# Patient Record
Sex: MALE | Race: WHITE | NOT HISPANIC OR LATINO | Employment: FULL TIME | URBAN - METROPOLITAN AREA
[De-identification: names, ages, dates, MRNs, and addresses within clinical notes are randomized per-mention and may not be internally consistent; named-entity substitution may affect disease eponyms.]

---

## 2023-10-23 ENCOUNTER — HOSPITAL ENCOUNTER (EMERGENCY)
Facility: CLINIC | Age: 38
Discharge: HOME | End: 2023-10-24
Attending: EMERGENCY MEDICINE | Admitting: EMERGENCY MEDICINE
Payer: COMMERCIAL

## 2023-10-23 ENCOUNTER — APPOINTMENT (OUTPATIENT)
Dept: RADIOLOGY | Facility: CLINIC | Age: 38
End: 2023-10-23
Attending: EMERGENCY MEDICINE
Payer: COMMERCIAL

## 2023-10-23 ENCOUNTER — APPOINTMENT (OUTPATIENT)
Dept: CT IMAGING | Facility: CLINIC | Age: 38
End: 2023-10-23
Attending: EMERGENCY MEDICINE
Payer: COMMERCIAL

## 2023-10-23 DIAGNOSIS — R55 SYNCOPE, UNSPECIFIED SYNCOPE TYPE: ICD-10-CM

## 2023-10-23 LAB
ALBUMIN SERPL BCG-MCNC: 4.3 G/DL (ref 3.5–5.2)
ALP SERPL-CCNC: 73 U/L (ref 40–129)
ALT SERPL W P-5'-P-CCNC: 31 U/L (ref 0–70)
ANION GAP SERPL CALCULATED.3IONS-SCNC: 9 MMOL/L (ref 7–15)
APTT PPP: 24 SECONDS (ref 22–38)
AST SERPL W P-5'-P-CCNC: 25 U/L (ref 0–45)
ATRIAL RATE - MUSE: 78 BPM
BASOPHILS # BLD AUTO: 0.1 10E3/UL (ref 0–0.2)
BASOPHILS NFR BLD AUTO: 1 %
BILIRUB SERPL-MCNC: 0.4 MG/DL
BUN SERPL-MCNC: 20.6 MG/DL (ref 6–20)
CALCIUM SERPL-MCNC: 8.8 MG/DL (ref 8.6–10)
CHLORIDE SERPL-SCNC: 103 MMOL/L (ref 98–107)
CREAT SERPL-MCNC: 1.25 MG/DL (ref 0.67–1.17)
DEPRECATED HCO3 PLAS-SCNC: 26 MMOL/L (ref 22–29)
DIASTOLIC BLOOD PRESSURE - MUSE: 88 MMHG
EGFRCR SERPLBLD CKD-EPI 2021: 76 ML/MIN/1.73M2
EOSINOPHIL # BLD AUTO: 0.1 10E3/UL (ref 0–0.7)
EOSINOPHIL NFR BLD AUTO: 1 %
ERYTHROCYTE [DISTWIDTH] IN BLOOD BY AUTOMATED COUNT: 11.9 % (ref 10–15)
GLUCOSE SERPL-MCNC: 91 MG/DL (ref 70–99)
HCT VFR BLD AUTO: 41.2 % (ref 40–53)
HGB BLD-MCNC: 13.8 G/DL (ref 13.3–17.7)
IMM GRANULOCYTES # BLD: 0 10E3/UL
IMM GRANULOCYTES NFR BLD: 0 %
INR PPP: 0.94 (ref 0.85–1.15)
INTERPRETATION ECG - MUSE: NORMAL
LYMPHOCYTES # BLD AUTO: 1.9 10E3/UL (ref 0.8–5.3)
LYMPHOCYTES NFR BLD AUTO: 18 %
MAGNESIUM SERPL-MCNC: 3.1 MG/DL (ref 1.7–2.3)
MCH RBC QN AUTO: 28.9 PG (ref 26.5–33)
MCHC RBC AUTO-ENTMCNC: 33.5 G/DL (ref 31.5–36.5)
MCV RBC AUTO: 86 FL (ref 78–100)
MONOCYTES # BLD AUTO: 0.7 10E3/UL (ref 0–1.3)
MONOCYTES NFR BLD AUTO: 7 %
NEUTROPHILS # BLD AUTO: 7.8 10E3/UL (ref 1.6–8.3)
NEUTROPHILS NFR BLD AUTO: 73 %
NRBC # BLD AUTO: 0 10E3/UL
NRBC BLD AUTO-RTO: 0 /100
P AXIS - MUSE: 35 DEGREES
PLATELET # BLD AUTO: 296 10E3/UL (ref 150–450)
POTASSIUM SERPL-SCNC: 4 MMOL/L (ref 3.4–5.3)
PR INTERVAL - MUSE: 168 MS
PROT SERPL-MCNC: 7.3 G/DL (ref 6.4–8.3)
QRS DURATION - MUSE: 100 MS
QT - MUSE: 364 MS
QTC - MUSE: 414 MS
R AXIS - MUSE: 27 DEGREES
RBC # BLD AUTO: 4.77 10E6/UL (ref 4.4–5.9)
SODIUM SERPL-SCNC: 138 MMOL/L (ref 135–145)
SYSTOLIC BLOOD PRESSURE - MUSE: 144 MMHG
T AXIS - MUSE: -5 DEGREES
TROPONIN T SERPL HS-MCNC: <6 NG/L
VENTRICULAR RATE- MUSE: 78 BPM
WBC # BLD AUTO: 10.6 10E3/UL (ref 4–11)

## 2023-10-23 PROCEDURE — 93005 ELECTROCARDIOGRAM TRACING: CPT | Performed by: EMERGENCY MEDICINE

## 2023-10-23 PROCEDURE — 84484 ASSAY OF TROPONIN QUANT: CPT | Performed by: EMERGENCY MEDICINE

## 2023-10-23 PROCEDURE — 85025 COMPLETE CBC W/AUTO DIFF WBC: CPT | Performed by: EMERGENCY MEDICINE

## 2023-10-23 PROCEDURE — 99285 EMERGENCY DEPT VISIT HI MDM: CPT | Mod: 25

## 2023-10-23 PROCEDURE — 71045 X-RAY EXAM CHEST 1 VIEW: CPT

## 2023-10-23 PROCEDURE — 85610 PROTHROMBIN TIME: CPT | Performed by: EMERGENCY MEDICINE

## 2023-10-23 PROCEDURE — 83735 ASSAY OF MAGNESIUM: CPT | Performed by: EMERGENCY MEDICINE

## 2023-10-23 PROCEDURE — 36415 COLL VENOUS BLD VENIPUNCTURE: CPT | Performed by: EMERGENCY MEDICINE

## 2023-10-23 PROCEDURE — 85730 THROMBOPLASTIN TIME PARTIAL: CPT | Performed by: EMERGENCY MEDICINE

## 2023-10-23 PROCEDURE — 80053 COMPREHEN METABOLIC PANEL: CPT | Performed by: EMERGENCY MEDICINE

## 2023-10-23 PROCEDURE — 70450 CT HEAD/BRAIN W/O DYE: CPT

## 2023-10-23 ASSESSMENT — ENCOUNTER SYMPTOMS
NAUSEA: 0
FEVER: 0
LIGHT-HEADEDNESS: 1
SHORTNESS OF BREATH: 0
DIARRHEA: 0
VOMITING: 0
HEADACHES: 0

## 2023-10-23 ASSESSMENT — ACTIVITIES OF DAILY LIVING (ADL): ADLS_ACUITY_SCORE: 35

## 2023-10-24 VITALS
SYSTOLIC BLOOD PRESSURE: 121 MMHG | HEART RATE: 81 BPM | BODY MASS INDEX: 33.18 KG/M2 | DIASTOLIC BLOOD PRESSURE: 81 MMHG | WEIGHT: 224 LBS | RESPIRATION RATE: 15 BRPM | OXYGEN SATURATION: 96 % | TEMPERATURE: 98 F | HEIGHT: 69 IN

## 2023-10-24 LAB — TROPONIN T SERPL HS-MCNC: 7 NG/L

## 2023-10-24 PROCEDURE — 36415 COLL VENOUS BLD VENIPUNCTURE: CPT | Performed by: EMERGENCY MEDICINE

## 2023-10-24 PROCEDURE — 84484 ASSAY OF TROPONIN QUANT: CPT | Performed by: EMERGENCY MEDICINE

## 2023-10-24 NOTE — ED TRIAGE NOTES
Pt reports he was sitting on the couch when he started to feel lightheaded and dizzy.  Pt had a brief syncopal episode while sitting on the couch and his wife assisted him to the ground. Pt denies pain currently along with denying nausea, shortness of breath or dizziness. Pt did have one alcoholic beverage and one hit of THC and reports it is not abnormal for him.  EMS reports pt was orthostatic when they checked vitals.  Pt currently denies any complaints.   Pt reports he did not take his lisinopril for about one week but has the last two days.

## 2023-10-24 NOTE — DISCHARGE INSTRUCTIONS
All of the laboratory tests and imaging studies appear reassuring here tonight in the emergency department.  Your syncopal episode is likely due to a transient drop in your blood pressure.     Continue to rest and to drink plenty of fluids over the next few days.  Follow-up with your primary care provider for routine reevaluation within the next few days.  Return back to ED sooner for any repeat syncopal episodes or any other new or concerning symptoms.

## 2023-10-24 NOTE — ED PROVIDER NOTES
EMERGENCY DEPARTMENT ENCOUNTER      NAME: Binh Schwarz  AGE: 38 year old male  YOB: 1985  MRN: [id number]  EVALUATION DATE & TIME: 10/23/2023  9:22 PM    PCP: [facility]    ED PROVIDER: Annamarie Dong DO      Chief Complaint   Patient presents with    Syncope         FINAL IMPRESSION:  1. Syncope, unspecified syncope type          ED COURSE & MEDICAL DECISION MAKIN-year-old male was brought into the ED for evaluation after he experienced a syncopal episode while at rest prior to ED arrival.  Patient notes that he felt lightheaded prior to the single episode.  However, he denied any other symptoms.  There is no reported trauma or injury with the syncopal episode.  The patient was reportedly noted to be hypotensive when EMS arrived with systolic pressures in the 80s.  Here in the ED the patient had no complaint of any symptoms and he was hemodynamically stable upon arrival to the ED.  The patient did not appear to be in any obvious distress or discomfort at the time of his initial evaluation.  The patient's physical exam was unremarkable.    The EKG reveals normal sinus rhythm with nonspecific T wave changes noted.    CBC, BMP, and troponin were all reassuring.  The patient's PERC score was 0.    CT scan of the head and chest x-ray were both nondiagnostic.    No changes noted in the patient's orthostatic vital signs between lying and standing.  Patient denied any symptoms with standing while his blood pressure was obtained.    Repeat troponin was unchanged.    Patient was reevaluated and informed of the reassuring lab and imaging results.  Patient was informed that his simple episodes likely need to a transient drop in his blood pressure.  After educating and reassure the patient both he and his wife felt comfortable returning home.  The patient was instructed to follow-up with his primary care provider for reevaluation or to return back to ED sooner for any repeat  syncopal episodes, chest pain or pressure, shortness breath, or any other new or concerning symptoms.    Pertinent Labs & Imaging studies reviewed. (See chart for details)  10:17 PM I met with the patient to gather history and to perform my initial exam. We discussed plans for the ED course, including diagnostic testing and treatment.       At the conclusion of the encounter I discussed the results of all of the tests and the disposition. The questions were answered. The patient or family acknowledged understanding and was agreeable with the care plan.     Medical Decision Making    History:  Supplemental history from: Documented in chart, if applicable  External Record(s) reviewed: Documented in chart, if applicable.    Work Up:  Chart documentation includes differential considered and any EKGs or imaging independently interpreted by provider, where specified.  In additional to work up documented, I considered the following work up: Documented in chart, if applicable.    External consultation:  Discussion of management with another provider: Documented in chart, if applicable    Complicating factors:  Care impacted by chronic illness: N/A  Care affected by social determinants of health: N/A    Disposition considerations: Discharge. No recommendations on prescription strength medication(s). I considered admission, but discharged the patient after share decision making conversation.      PPE worn: n95 mask, goggles    MEDICATIONS GIVEN IN THE EMERGENCY:  Medications - No data to display    NEW PRESCRIPTIONS STARTED AT TODAY'S ER VISIT  New Prescriptions    No medications on file          =================================================================    HPI    Patient information was obtained from: Patient    Use of : Alyssa      Binh Schwarz is a 38 year old male with a history of hypertension and hyperlipidemia who presents to this ED by EMS for evaluation of syncope.    Earlier tonight the patient  "was sitting on his couch, when he started to feel lightheaded. He then laid down on the floor, when he tried to sit back up, he experienced a loss of consciousness. He did not hit his head. This experience is not normal for him. The patient stated that he otherwise felt fine today.     The patient feels fine now and is experiencing no symptoms. Before the event occurred, he had one alcoholic drink and a hit of THC. This is normal behavior for him. The patient missed taking his lisinopril for one week, but has taken it the past 2 days.       REVIEW OF SYSTEMS   Review of Systems   Constitutional:  Negative for fever.   Respiratory:  Negative for shortness of breath.    Cardiovascular:  Negative for chest pain.   Gastrointestinal:  Negative for diarrhea, nausea and vomiting.   Neurological:  Positive for syncope and light-headedness. Negative for headaches.   All other systems reviewed and are negative.     PAST MEDICAL HISTORY:  History reviewed. No pertinent past medical history.    PAST SURGICAL HISTORY:  History reviewed. No pertinent surgical history.        CURRENT MEDICATIONS:    No current outpatient medications on file.      ALLERGIES:  No Known Allergies    FAMILY HISTORY:  History reviewed. No pertinent family history.    SOCIAL HISTORY:   Social History     Socioeconomic History    Marital status:      Spouse name: None    Number of children: None    Years of education: None    Highest education level: None       VITALS:  /77   Pulse 77   Temp 98  F (36.7  C) (Oral)   Resp 19   Ht 1.753 m (5' 9\")   Wt 101.6 kg (224 lb)   SpO2 95%   BMI 33.08 kg/m      PHYSICAL EXAM    General presentation: Alert, Vital signs reviewed. NAD  HENT: ENT inspection is normal. Oropharynx is moist and clear.   Eye: Pupils are equal and reactive to light. EOMI  Neck: The neck is supple, with full ROM, with no evidence of meningismus.  Pulmonary: Currently in no acute respiratory distress. Normal, non labored " respirations, the lung sounds are normal with good equal air movement. Clear to auscultation bilaterally.   Circulatory: Regular rate and rhythm. Peripheral pulses are strong and equal. No murmurs, rubs, or gallops.   Abdominal: The abdomen is soft. Nontender. No rigidity, guarding, or rebound. Bowel sounds normal.   Neurologic: Alert, oriented to person, place, and time. No motor deficit. No sensory deficit. Cranial nerves II through XII are intact.  Musculoskeletal: No extremity tenderness. Full range of motion in all extremities. No extremity edema.   Skin: Skin color is normal. No rash. Warm. Dry to touch.      LAB:  All pertinent labs reviewed and interpreted.  Results for orders placed or performed during the hospital encounter of 10/23/23   XR Chest Port 1 View    Impression    IMPRESSION:  Mild elevation right hemidiaphragm. EKG wires and leads projecting over the chest and abdomen obscure some details. Mild low lung volumes. Heart size and pulmonary vascularity within normal limits. No focal lung infiltrates. Osseous   structures grossly intact. Visualized upper abdomen unremarkable.   Head CT w/o contrast    Impression    IMPRESSION:  1.  No acute intracranial process.   Result Value Ref Range    INR 0.94 0.85 - 1.15   Partial thromboplastin time   Result Value Ref Range    aPTT 24 22 - 38 Seconds   Comprehensive metabolic panel   Result Value Ref Range    Sodium 138 135 - 145 mmol/L    Potassium 4.0 3.4 - 5.3 mmol/L    Carbon Dioxide (CO2) 26 22 - 29 mmol/L    Anion Gap 9 7 - 15 mmol/L    Urea Nitrogen 20.6 (H) 6.0 - 20.0 mg/dL    Creatinine 1.25 (H) 0.67 - 1.17 mg/dL    GFR Estimate 76 >60 mL/min/1.73m2    Calcium 8.8 8.6 - 10.0 mg/dL    Chloride 103 98 - 107 mmol/L    Glucose 91 70 - 99 mg/dL    Alkaline Phosphatase 73 40 - 129 U/L    AST 25 0 - 45 U/L    ALT 31 0 - 70 U/L    Protein Total 7.3 6.4 - 8.3 g/dL    Albumin 4.3 3.5 - 5.2 g/dL    Bilirubin Total 0.4 <=1.2 mg/dL   Result Value Ref Range     Magnesium 3.1 (H) 1.7 - 2.3 mg/dL   Result Value Ref Range    Troponin T, High Sensitivity <6 <=22 ng/L   CBC with platelets and differential   Result Value Ref Range    WBC Count 10.6 4.0 - 11.0 10e3/uL    RBC Count 4.77 4.40 - 5.90 10e6/uL    Hemoglobin 13.8 13.3 - 17.7 g/dL    Hematocrit 41.2 40.0 - 53.0 %    MCV 86 78 - 100 fL    MCH 28.9 26.5 - 33.0 pg    MCHC 33.5 31.5 - 36.5 g/dL    RDW 11.9 10.0 - 15.0 %    Platelet Count 296 150 - 450 10e3/uL    % Neutrophils 73 %    % Lymphocytes 18 %    % Monocytes 7 %    % Eosinophils 1 %    % Basophils 1 %    % Immature Granulocytes 0 %    NRBCs per 100 WBC 0 <1 /100    Absolute Neutrophils 7.8 1.6 - 8.3 10e3/uL    Absolute Lymphocytes 1.9 0.8 - 5.3 10e3/uL    Absolute Monocytes 0.7 0.0 - 1.3 10e3/uL    Absolute Eosinophils 0.1 0.0 - 0.7 10e3/uL    Absolute Basophils 0.1 0.0 - 0.2 10e3/uL    Absolute Immature Granulocytes 0.0 <=0.4 10e3/uL    Absolute NRBCs 0.0 10e3/uL   Troponin T, High Sensitivity (now)   Result Value Ref Range    Troponin T, High Sensitivity 7 <=22 ng/L   ECG 12-LEAD WITH MUSE (LHE)   Result Value Ref Range    Systolic Blood Pressure 144 mmHg    Diastolic Blood Pressure 88 mmHg    Ventricular Rate 78 BPM    Atrial Rate 78 BPM    WI Interval 168 ms    QRS Duration 100 ms     ms    QTc 414 ms    P Axis 35 degrees    R AXIS 27 degrees    T Axis -5 degrees    Interpretation ECG       Sinus rhythm  Inferior infarct , age undetermined  Abnormal ECG  No previous ECGs available  Confirmed by SEE ED PROVIDER NOTE FOR, ECG INTERPRETATION ([id number]),  Phyllis Mosquera ([id number]) on 10/23/2023 10:30:51 PM         RADIOLOGY:  Reviewed all pertinent imaging. Please see official radiology report.  XR Chest Port 1 View   Final Result   IMPRESSION:  Mild elevation right hemidiaphragm. EKG wires and leads projecting over the chest and abdomen obscure some details. Mild low lung volumes. Heart size and pulmonary vascularity within normal limits. No focal lung  infiltrates. Osseous    structures grossly intact. Visualized upper abdomen unremarkable.      Head CT w/o contrast   Final Result   IMPRESSION:   1.  No acute intracranial process.          EKG:    Normal sinus rhythm.  Rate of 78.  Normal QRS.  Normal QT.  Nonspecific T wave changes noted.  No previous EKG available for comparison.    I have independently reviewed and interpreted the EKG(s) documented above.         I, Marcia Fallon, am serving as a scribe to document services personally performed by Annamarie Dong DO based on my observation and the provider's statements to me. I, Annamarie Dong, attest that Marcia Fallon is acting in a scribe capacity, has observed my performance of the services and has documented them in accordance with my direction.    Annamarie Dong DO  Emergency Medicine  [facility] EMERGENCY ROOM  [address] [place] [id number]  [phone]      Annamarie Dong DO  10/24/23 0101

## 2023-12-31 ENCOUNTER — HEALTH MAINTENANCE LETTER (OUTPATIENT)
Age: 38
End: 2023-12-31

## 2025-01-19 ENCOUNTER — HEALTH MAINTENANCE LETTER (OUTPATIENT)
Age: 40
End: 2025-01-19